# Patient Record
Sex: FEMALE | Race: WHITE
[De-identification: names, ages, dates, MRNs, and addresses within clinical notes are randomized per-mention and may not be internally consistent; named-entity substitution may affect disease eponyms.]

---

## 2017-12-12 NOTE — RAD
AP VIEW OF THE CHEST:

 

INDICATION: 

Chest pain and shortness of breath.

 

COMPARISON: 

None.

 

IMPRESSION: 

Lungs are clear.  Cardiomediastinal silhouette is within normal limits.  No acute osseous abnormality
 is evident.

 

POS: [facility]

## 2018-04-04 NOTE — MRI
MRI OF RIGHT KNEE

4/4/18

 

PROVIDED CLINICAL HISTORY:  

Chronic right knee pain. 

 

FINDINGS:  

Comparison is made with the study dated 2/15/08. 

 

The anterior cruciate ligament, posterior cruciate ligament, medial collateral ligament and lateral c
ollateral ligamentous complex demonstrate an intact MR appearance, as well as the extensor mechanism.
 

 

The medial and lateral menisci demonstrate no evidence of tear.

 

No focal articular cartilage defect is apparent. 

 

The amount of fluid within the knee joint appears physiologic. 

 

No focal concerning regional marrow or muscular signal abnormality is apparent. 

 

IMPRESSION:  

No evidence for internal derangement. 

 

POS: [facility]

## 2019-11-16 ENCOUNTER — HOSPITAL ENCOUNTER (EMERGENCY)
Dept: HOSPITAL 92 - SCSER | Age: 34
Discharge: HOME | End: 2019-11-16
Payer: COMMERCIAL

## 2019-11-16 DIAGNOSIS — S79.911A: Primary | ICD-10-CM
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
## 2019-11-16 LAB
ALBUMIN SERPL BCG-MCNC: 4 G/DL (ref 3.5–5)
ALP SERPL-CCNC: 78 U/L (ref 40–110)
ALT SERPL W P-5'-P-CCNC: 14 U/L (ref 8–55)
ANION GAP SERPL CALC-SCNC: 14 MMOL/L (ref 10–20)
AST SERPL-CCNC: 14 U/L (ref 5–34)
BASOPHILS # BLD AUTO: 0.1 THOU/UL (ref 0–0.2)
BASOPHILS NFR BLD AUTO: 0.8 % (ref 0–1)
BILIRUB SERPL-MCNC: 0.2 MG/DL (ref 0.2–1.2)
BUN SERPL-MCNC: 9 MG/DL (ref 7–18.7)
CALCIUM SERPL-MCNC: 9 MG/DL (ref 7.8–10.44)
CHLORIDE SERPL-SCNC: 109 MMOL/L (ref 98–107)
CO2 SERPL-SCNC: 21 MMOL/L (ref 22–29)
CREAT CL PREDICTED SERPL C-G-VRATE: 0 ML/MIN (ref 70–130)
EOSINOPHIL # BLD AUTO: 0.1 THOU/UL (ref 0–0.7)
EOSINOPHIL NFR BLD AUTO: 1.4 % (ref 0–10)
GLOBULIN SER CALC-MCNC: 3.1 G/DL (ref 2.4–3.5)
GLUCOSE SERPL-MCNC: 105 MG/DL (ref 70–105)
HGB BLD-MCNC: 11.2 G/DL (ref 12–16)
LYMPHOCYTES # BLD: 3.6 THOU/UL (ref 1.2–3.4)
LYMPHOCYTES NFR BLD AUTO: 42.2 % (ref 21–51)
MCH RBC QN AUTO: 29.7 PG (ref 27–31)
MCV RBC AUTO: 89.6 FL (ref 78–98)
MONOCYTES # BLD AUTO: 0.5 THOU/UL (ref 0.11–0.59)
MONOCYTES NFR BLD AUTO: 6.3 % (ref 0–10)
NEUTROPHILS # BLD AUTO: 4.2 THOU/UL (ref 1.4–6.5)
NEUTROPHILS NFR BLD AUTO: 49.3 % (ref 42–75)
PLATELET # BLD AUTO: 283 THOU/UL (ref 130–400)
POTASSIUM SERPL-SCNC: 3.5 MMOL/L (ref 3.5–5.1)
PREGS CONTROL BACKGROUND?: (no result)
PREGS CONTROL BAR APPEAR?: YES
RBC # BLD AUTO: 3.78 MILL/UL (ref 4.2–5.4)
SODIUM SERPL-SCNC: 140 MMOL/L (ref 136–145)
WBC # BLD AUTO: 8.5 THOU/UL (ref 4.8–10.8)

## 2019-11-16 PROCEDURE — 85025 COMPLETE CBC W/AUTO DIFF WBC: CPT

## 2019-11-16 PROCEDURE — 84703 CHORIONIC GONADOTROPIN ASSAY: CPT

## 2019-11-16 PROCEDURE — 36415 COLL VENOUS BLD VENIPUNCTURE: CPT

## 2019-11-16 PROCEDURE — 72170 X-RAY EXAM OF PELVIS: CPT

## 2019-11-16 PROCEDURE — 96375 TX/PRO/DX INJ NEW DRUG ADDON: CPT

## 2019-11-16 PROCEDURE — 72131 CT LUMBAR SPINE W/O DYE: CPT

## 2019-11-16 PROCEDURE — 96374 THER/PROPH/DIAG INJ IV PUSH: CPT

## 2019-11-16 PROCEDURE — 80053 COMPREHEN METABOLIC PANEL: CPT

## 2019-11-16 NOTE — CT
CT LUMBAR SPINE WITHOUT CONTRAST:

11/16/19

 

HISTORY: 

Fall. Right leg keeps giving out.  Pain.

 

FINDINGS: 

Five lumbar type vertebrae. The lumbar spine vertebral body height is maintained. No lumbar spine fra
cture. There are remote bilateral pars defects at L5 without significant spondylolisthesis. 

 

The visualized paraspinal structures are unremarkable. Symmetric attenuation of the psoas muscles. No
 retroperitoneal mass, lymphadenopathy, or hematoma. The visualized alimentary canal is unremarkable.
 

 

Limited evaluation of the contents of the central spinal canal and neural foramina due to technique. 


 

Visualized sacrum and bony pelvis are intact. The visualized sacral ala are preserved.

 

T12-L1: No significant central canal stenosis or significant foraminal narrowing.

 

L1-L2: No significant central canal stenosis or significant neural foraminal narrowing. 

 

L2-L3: There is a broad based disc bulge that abuts the thecal sac. Minimal ligamentum flavum thicken
ing and facet hypertrophy. No significant central canal stenosis or significant neural foraminal narr
owing. 

 

L3-L4: Broad based disc bulge minimally contacts the ventral thecal sac. Minimal ligamentous flavum t
hickening. No significant central canal stenosis. Bilaterally, neural foramina are patent. 

 

L4-L5: Broad based disc bulge abuts the thecal sac. Mild ligamentum flavum thickening and facet hyper
trophy. Overall mild central canal stenosis and mild neural foraminal narrowing.

 

L5-S1: Broad based disc bulge makes contact with the left and right subarticular zone. Mass effect an
d partial obscuration of bilateral traversing S1 nerve roots. No significant central canal stenosis. 
Mild bilateral foraminal narrowing. 

 

IMPRESSION: 

1.      Degenerative changes of the lumbar spine as above. 

2.      No evidence of lumbar spine vertebral body fracture.

3.      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 No associated spondylolisthesis
. 

 

POS: [facility]

## 2019-11-16 NOTE — CT
RIGHT HIP CT WITHOUT CONTRAST:

11/16/19

 

HISTORY: 

Slip and fall. Pain.

 

COMPARISON: 

None.

 

FINDINGS: 

The visualized right hemipelvis is unremarkable. No evidence of an injury to the visualized alimentar
y canal as well as uterus and right adnexal structures. Unremarkable urinary bladder. 

 

Right sacroiliac joint is patent. The visualized sacral ala are preserved. There is no evidence of a 
right iliac bone or sacral fracture. 

 

Right hip joint space is preserved. Contour of the femoral head is maintained. No fracture. 

 

The visualized right inferior pubic ramus and superior pubic ramus are intact. 

 

IMPRESSION: 

No fracture. 

 

POS: [facility]

## 2019-11-16 NOTE — RAD
XR Pelvis AP STANDARD



HISTORY: Fall, right hip pain



FINDINGS:

No fracture or dislocation is identified.



Reported By: Niko Carvajal 

Electronically Signed:  11/16/2019 7:18 PM

## 2019-11-16 NOTE — RAD
XR Hip Rt 2-3 View



HISTORY: Fall, right hip pain



FINDINGS:

No fracture or dislocation is identified.



If there is high suspicion for fracture, further evaluation with CT scan should be performed.



Reported By: Niko Carvajal 

Electronically Signed:  11/16/2019 7:19 PM

## 2019-12-26 ENCOUNTER — HOSPITAL ENCOUNTER (OUTPATIENT)
Dept: HOSPITAL 92 - BICMRI | Age: 34
Discharge: HOME | End: 2019-12-26
Attending: ORTHOPAEDIC SURGERY
Payer: COMMERCIAL

## 2019-12-26 DIAGNOSIS — M22.2X1: ICD-10-CM

## 2019-12-26 DIAGNOSIS — M25.561: Primary | ICD-10-CM

## 2019-12-26 NOTE — MRI
MRI OF RIGHT KNEE PERFORMED WITHOUT CONTRAST ENHANCEMENT:

12/26/19

 

HISTORY: 

Right knee pain for about two years. 

 

Anterior cruciate ligament is intact. There is a small ACL cyst present. The posterior cruciate ligam
ent is normal in appearance.

 

The medial as well as the lateral meniscus have a normal shape and appearance. 

 

The medial and lateral collateral ligaments and iliotibial band regions are normal. 

 

There is a new full thickness fissure within the lateral facet of the patella. The fissure is of some
what gray signal changes could indicate that this is somewhat older and this may represent some granu
lation tissue within this fissure. The medial facet appears unremarkable. The medial and lateral summers
llar retinaculum as well as the quadriceps and patellar tendons are normal. The medial and lateral co
mpartments of the knee show no evidence of any significant articular cartilage loss. There is no Bake
r's cyst formation. 

 

IMPRESSION: 

Linear near full thickness fissure involving the lateral facet patellar articular cartilage. 

 

POS: [facility]